# Patient Record
Sex: MALE | HISPANIC OR LATINO | URBAN - METROPOLITAN AREA
[De-identification: names, ages, dates, MRNs, and addresses within clinical notes are randomized per-mention and may not be internally consistent; named-entity substitution may affect disease eponyms.]

---

## 2017-08-03 ENCOUNTER — EMERGENCY (EMERGENCY)
Facility: HOSPITAL | Age: 47
LOS: 1 days | Discharge: HOME | End: 2017-08-03
Attending: EMERGENCY MEDICINE | Admitting: EMERGENCY MEDICINE
Payer: MEDICAID

## 2017-08-03 VITALS
SYSTOLIC BLOOD PRESSURE: 121 MMHG | OXYGEN SATURATION: 100 % | HEART RATE: 79 BPM | TEMPERATURE: 98 F | DIASTOLIC BLOOD PRESSURE: 79 MMHG | RESPIRATION RATE: 18 BRPM

## 2017-08-03 PROCEDURE — 99283 EMERGENCY DEPT VISIT LOW MDM: CPT

## 2017-08-03 RX ORDER — IBUPROFEN 200 MG
600 TABLET ORAL ONCE
Qty: 0 | Refills: 0 | Status: COMPLETED | OUTPATIENT
Start: 2017-08-03 | End: 2017-08-03

## 2017-08-03 RX ADMIN — Medication 600 MILLIGRAM(S): at 18:42

## 2017-08-03 NOTE — ED PROVIDER NOTE - OBJECTIVE STATEMENT
45y/o M PMH DM and HTN c/o joint pains x 4 weeks. pt states he has pain in both feet, ankles, wrists, and hands. pain is the same, has not improved or worsened over the past 4 wks. pt taking ibuprofen which helps a little. pt states pain is worse with moving.

## 2017-08-03 NOTE — ED PROVIDER NOTE - EXTREMITY EXAM
FROM of UEs and LEs b/l. + ttp of plantar aspect of feet b/l, medial aspect of ankles b/l, and anterior aspects of wrists b/l. no joint swelling. n

## 2017-08-03 NOTE — ED PROVIDER NOTE - CARE PLAN
Instructions for follow-up, activity and diet:	Follow up with a/your rheumatologist in 1 week or call our clinic at [phone] and arrange an appointment.   Follow up with your medical doctor in 2-3 days or call our clinic at [phone] and state you were seen in the Emergency Department and would like to be seen in clinic.   Take Tylenol 1g every six hours and supplement with ibuprofen 600mg, with food, every six hours which can be taken three hours apart from the Tylenol to have a layered effect.  Drink at least 2 Liters or 64 Ounces of water each day.  Return for any persistent, worsening symptoms, or ANY concerns at all. Principal Discharge DX:	Arthritis  Instructions for follow-up, activity and diet:	Follow up with your rheumatologist in 1 week or call our rheumatology clinic at [phone] and arrange an appointment.   Follow up with your medical doctor in 2-3 days or call our clinic at [phone] and state you were seen in the Emergency Department and would like to be seen in clinic.   Take Tylenol 1g every six hours and take Naproxen 500mg twice a day with food for pain.  Drink at least 2 Liters or 64 Ounces of water each day.  Return for any persistent, worsening symptoms, or ANY concerns at all. Principal Discharge DX:	Arthritis  Instructions for follow-up, activity and diet:	Follow up with your rheumatologist in 1 week or call our rheumatology clinic at [phone] and arrange an appointment.   Follow up with your medical doctor in 2-3 days or call our clinic at [phone] and state you were seen in the Emergency Department and would like to be seen in clinic.   Take Tylenol 1g every six hours and take Naproxen 500mg twice a day with food for pain.  Drink at least 2 Liters or 64 Ounces of water each day.  Return for any persistent, worsening symptoms, or ANY concerns at all.

## 2017-08-03 NOTE — ED PROVIDER NOTE - PLAN OF CARE
Follow up with a/your rheumatologist in 1 week or call our clinic at [phone] and arrange an appointment.   Follow up with your medical doctor in 2-3 days or call our clinic at [phone] and state you were seen in the Emergency Department and would like to be seen in clinic.   Take Tylenol 1g every six hours and supplement with ibuprofen 600mg, with food, every six hours which can be taken three hours apart from the Tylenol to have a layered effect.  Drink at least 2 Liters or 64 Ounces of water each day.  Return for any persistent, worsening symptoms, or ANY concerns at all. Follow up with your rheumatologist in 1 week or call our rheumatology clinic at [phone] and arrange an appointment.   Follow up with your medical doctor in 2-3 days or call our clinic at [phone] and state you were seen in the Emergency Department and would like to be seen in clinic.   Take Tylenol 1g every six hours and take Naproxen 500mg twice a day with food for pain.  Drink at least 2 Liters or 64 Ounces of water each day.  Return for any persistent, worsening symptoms, or ANY concerns at all.

## 2017-08-03 NOTE — ED PROVIDER NOTE - ATTENDING CONTRIBUTION TO CARE
Joint pain to bilateral feet, ankles, wrists x 2 weeks. Moderate, persistent. Patient has tried ibuprofen without much relief. No heavy lifting. Mild swelling.   will offer naproxen bid and Tylenol Dr. Abel DELA CRUZ used to translate.  Joint pain to bilateral feet, ankles, wrists x 2 weeks. Moderate, persistent. Patient has tried ibuprofen without much relief. No heavy lifting. Mild swelling.   will offer naproxen bid and Tylenol and rheumatology  follow up.  No immediate life threatening issues present on history or clinical exam. Patient is a safe disposition home, has capacity and insight into their condition, ambulatory in the emergency department and will follow up with their doctor(s) this week. Patient and family  understand anticipatory guidance were given strict return and follow up precautions.  The patient and family have been informed of all concerning signs and symptoms to return to Emergency Department, the necessity to follow up with PMD/Clinic/follow up provided within 2-3 days was explained, and the patient and/or family reports understanding of above with capacity and insight.

## 2017-08-24 PROBLEM — Z00.00 ENCOUNTER FOR PREVENTIVE HEALTH EXAMINATION: Status: ACTIVE | Noted: 2017-08-24

## 2017-09-27 ENCOUNTER — APPOINTMENT (OUTPATIENT)
Dept: RHEUMATOLOGY | Facility: CLINIC | Age: 47
End: 2017-09-27

## 2020-02-27 NOTE — ED ADULT TRIAGE NOTE - HEART RATE (BEATS/MIN)
Left voicemail on Alyse's confidential voicemail letting her know patient has had to have labs redrawn at another lab and we are awaiting results. 77